# Patient Record
Sex: FEMALE
[De-identification: names, ages, dates, MRNs, and addresses within clinical notes are randomized per-mention and may not be internally consistent; named-entity substitution may affect disease eponyms.]

---

## 2019-05-08 ENCOUNTER — APPOINTMENT (OUTPATIENT)
Dept: CARDIOLOGY | Facility: CLINIC | Age: 54
End: 2019-05-08

## 2019-06-10 ENCOUNTER — RESULT REVIEW (OUTPATIENT)
Age: 54
End: 2019-06-10

## 2019-06-12 ENCOUNTER — APPOINTMENT (OUTPATIENT)
Dept: CARDIOLOGY | Facility: CLINIC | Age: 54
End: 2019-06-12
Payer: COMMERCIAL

## 2019-06-12 ENCOUNTER — NON-APPOINTMENT (OUTPATIENT)
Age: 54
End: 2019-06-12

## 2019-06-12 VITALS
DIASTOLIC BLOOD PRESSURE: 68 MMHG | SYSTOLIC BLOOD PRESSURE: 107 MMHG | HEART RATE: 65 BPM | HEIGHT: 71 IN | OXYGEN SATURATION: 99 % | WEIGHT: 240 LBS | BODY MASS INDEX: 33.6 KG/M2

## 2019-06-12 DIAGNOSIS — Z00.00 ENCOUNTER FOR GENERAL ADULT MEDICAL EXAMINATION W/OUT ABNORMAL FINDINGS: ICD-10-CM

## 2019-06-12 PROCEDURE — 99204 OFFICE O/P NEW MOD 45 MIN: CPT

## 2019-06-12 PROCEDURE — 93000 ELECTROCARDIOGRAM COMPLETE: CPT

## 2019-06-12 NOTE — PHYSICAL EXAM
[General Appearance - Well Developed] : well developed [Normal Appearance] : normal appearance [Well Groomed] : well groomed [General Appearance - Well Nourished] : well nourished [No Deformities] : no deformities [Normal Conjunctiva] : the conjunctiva exhibited no abnormalities [General Appearance - In No Acute Distress] : no acute distress [Eyelids - No Xanthelasma] : the eyelids demonstrated no xanthelasmas [Normal Oral Mucosa] : normal oral mucosa [No Oral Pallor] : no oral pallor [No Oral Cyanosis] : no oral cyanosis [Normal Jugular Venous A Waves Present] : normal jugular venous A waves present [Normal Jugular Venous V Waves Present] : normal jugular venous V waves present [No Jugular Venous Jimenez A Waves] : no jugular venous jimenez A waves [Heart Rate And Rhythm] : heart rate and rhythm were normal [Heart Sounds] : normal S1 and S2 [Murmurs] : no murmurs present [Exaggerated Use Of Accessory Muscles For Inspiration] : no accessory muscle use [Respiration, Rhythm And Depth] : normal respiratory rhythm and effort [Auscultation Breath Sounds / Voice Sounds] : lungs were clear to auscultation bilaterally [Abdomen Soft] : soft [Abdomen Mass (___ Cm)] : no abdominal mass palpated [Abdomen Tenderness] : non-tender [Abnormal Walk] : normal gait [Gait - Sufficient For Exercise Testing] : the gait was sufficient for exercise testing [Cyanosis, Localized] : no localized cyanosis [Nail Clubbing] : no clubbing of the fingernails [Petechial Hemorrhages (___cm)] : no petechial hemorrhages [Skin Color & Pigmentation] : normal skin color and pigmentation [] : no rash [No Venous Stasis] : no venous stasis [No Skin Ulcers] : no skin ulcer [Skin Lesions] : no skin lesions [No Xanthoma] : no  xanthoma was observed [Oriented To Time, Place, And Person] : oriented to person, place, and time [Affect] : the affect was normal [Mood] : the mood was normal [No Anxiety] : not feeling anxious

## 2019-07-09 NOTE — REASON FOR VISIT
[Follow-Up - From Hospitalization] : follow-up of a recent hospitalization for [FreeTextEntry2] : chest pain  ER 3/2019  [FreeTextEntry1] :  55 yo F  with CP 3/2019 was seen in ER in  the [facility]    BP [id number]\par CP x 5 days  severe? pericarditis  Aleve  helped\par \par today with  NO CP\par seen pcp DR MARTEL - [address]  [place]IP\par

## 2019-07-09 NOTE — ASSESSMENT
[FreeTextEntry1] : 53 yo F with PMH of CP and episodic HTN. obesity\par \par low salt diet\par echo \par EST\par

## 2020-10-01 DIAGNOSIS — Z01.818 ENCOUNTER FOR OTHER PREPROCEDURAL EXAMINATION: ICD-10-CM

## 2020-10-02 ENCOUNTER — APPOINTMENT (OUTPATIENT)
Dept: DISASTER EMERGENCY | Facility: CLINIC | Age: 55
End: 2020-10-02

## 2020-10-03 LAB — SARS-COV-2 N GENE NPH QL NAA+PROBE: NOT DETECTED

## 2020-10-06 ENCOUNTER — APPOINTMENT (OUTPATIENT)
Dept: PULMONOLOGY | Facility: CLINIC | Age: 55
End: 2020-10-06
Payer: COMMERCIAL

## 2020-10-06 VITALS — HEIGHT: 70 IN | WEIGHT: 251 LBS | BODY MASS INDEX: 35.93 KG/M2 | HEART RATE: 64 BPM | TEMPERATURE: 97.9 F

## 2020-10-06 PROCEDURE — 94726 PLETHYSMOGRAPHY LUNG VOLUMES: CPT

## 2020-10-06 PROCEDURE — 94010 BREATHING CAPACITY TEST: CPT

## 2020-10-06 PROCEDURE — 94729 DIFFUSING CAPACITY: CPT

## 2020-10-08 ENCOUNTER — APPOINTMENT (OUTPATIENT)
Dept: PULMONOLOGY | Facility: CLINIC | Age: 55
End: 2020-10-08
Payer: COMMERCIAL

## 2020-10-08 VITALS
OXYGEN SATURATION: 100 % | HEIGHT: 70 IN | SYSTOLIC BLOOD PRESSURE: 122 MMHG | HEART RATE: 69 BPM | WEIGHT: 256 LBS | DIASTOLIC BLOOD PRESSURE: 69 MMHG | TEMPERATURE: 98.1 F | BODY MASS INDEX: 36.65 KG/M2 | RESPIRATION RATE: 18 BRPM

## 2020-10-08 DIAGNOSIS — Z78.9 OTHER SPECIFIED HEALTH STATUS: ICD-10-CM

## 2020-10-08 DIAGNOSIS — J30.2 OTHER SEASONAL ALLERGIC RHINITIS: ICD-10-CM

## 2020-10-08 DIAGNOSIS — Z80.7 FAMILY HISTORY OF OTHER MALIGNANT NEOPLASMS OF LYMPHOID, HEMATOPOIETIC AND RELATED TISSUES: ICD-10-CM

## 2020-10-08 PROCEDURE — 99204 OFFICE O/P NEW MOD 45 MIN: CPT

## 2020-10-11 NOTE — CONSULT LETTER
[Dear  ___] : Dear  [unfilled], [Consult Letter:] : I had the pleasure of evaluating your patient, [unfilled]. [Please see my note below.] : Please see my note below. [Consult Closing:] : Thank you very much for allowing me to participate in the care of this patient.  If you have any questions, please do not hesitate to contact me. [Sincerely,] : Sincerely, [FreeTextEntry2] : Dr. Milka Carbajal MD\par Fax: [phone] [FreeTextEntry3] : Natan Shea MD\par Attending Physician in Pulmonary & Critical Care Medicine\par  of Medicine\par [employer] [facility]

## 2020-10-11 NOTE — ASSESSMENT
[FreeTextEntry1] : Ms. Bowser is a 55-year-old female with obesity who recently contracted COVID in March 2020 who presents for evaluation. Reports symptoms of dyspnea that worsens with exertion and heat with associated wheezing, chest tightness, and dry cough. Symptoms improve with albuterol inhaler. ROS positive for seasonal allergies, low back pain, and JULIET symptomatology in the setting of recent weight gain and working nights.\par \par 1. Likely mild persistent asthma:\par - PFTs (Oct 2020) with mild restriction, low lung volumes, and mildly reduced diffusing capacity\par - Bronchodilator testing not performed\par - Repeat PFTs with bronchodilator testing in December 2020\par - Continue Ventolin prn with spacer provided today\par - If symptoms persist and/or PFTs consistent with asthma, then will need ICS/LABA\par - Recent CBC without peripheral eosinophilia\par \par 2. Dyspnea on exertion:\par - Possibly due to mild asthma vs. mild restrictive lung disease vs. obesity/deconditioning\par - Was supposed to go for 2D-echo last year, will obtain now\par - May need exercise stress test\par - Recommend 30-60 minutes of brisk walking daily along with light weight lifting\par - Dietary modifications, including low carbohydrate diet and maximizing \par \par 3. Seasonal allergies:\par - Start fluticasone nasal spray twice daily\par \par 4. Recent COVID-19 infection:\par - She will bring in a CD with the recent CXR's and reports\par - Most recent CXR in June 2020 with minimal residual linear atelectasis or fibrotic scarring at the right base with resolution of previously seen right perihilar and bilateral lower lobe infiltrates\par - Lung are clear to auscultation today\par - Consider chest CT without contrast if symptoms persist\par \par 5. JULIET symptomatology:\par - Symptoms include heavy snoring, excessive somnolence, severe fatigue, nonrestorative sleep, dry mouth, and headache upon awakening with associated 15-lb weight gain in the past year\par - Will likely needs diagnostic PSG if symptoms persist and she is unable to lose the weight she has gained in this past year\par \par Follow-up in 2-3 months or sooner prn

## 2020-10-11 NOTE — REVIEW OF SYSTEMS
[Fatigue] : fatigue [Nasal Congestion] : nasal congestion [Postnasal Drip] : postnasal drip [Cough] : cough [Chest Tightness] : chest tightness [Dyspnea] : dyspnea [Wheezing] : wheezing [Seasonal Allergies] : seasonal allergies [Back Pain] : back pain [Negative] : Endocrine [Fever] : no fever [Chills] : no chills

## 2020-10-11 NOTE — PHYSICAL EXAM
[No Acute Distress] : no acute distress [Well Nourished] : well nourished [Well Developed] : well developed [Normal Oropharynx] : normal oropharynx [Normal Appearance] : normal appearance [Supple] : supple [No Neck Mass] : no neck mass [No JVD] : no jvd [Normal Rate/Rhythm] : normal rate/rhythm [Normal Pulses] : normal pulses [Normal S1, S2] : normal s1, s2 [No Resp Distress] : no resp distress [Clear to Auscultation Bilaterally] : clear to auscultation bilaterally [No Abnormalities] : no abnormalities [Benign] : benign [Not Tender] : not tender [Soft] : soft [Normal Gait] : normal gait [No Clubbing] : no clubbing [No Cyanosis] : no cyanosis [No Edema] : no edema [FROM] : FROM [Normal Color/ Pigmentation] : normal color/ pigmentation [No Focal Deficits] : no focal deficits [Cranial Nerves Intact] : cranial nerves intact [No Motor Deficits] : no motor deficits [Oriented x3] : oriented x3 [Normal Affect] : normal affect [TextBox_2] : obese

## 2020-10-11 NOTE — HISTORY OF PRESENT ILLNESS
[Never] : never [TextBox_4] : Ms. Bowser is a 55-year-old female who recently contracted COVID in March 2020 who presents for evaluation. She reports occasional episodes of dyspnea and lower back pain requiring tylenol. She was diagnosed with COVID March 26, 2020. Symptoms included cough, lethargy, fatigue, and shortness of breath. She went to [facility] in April, but did not require any treatment or oxygen. She was given an albuterol inhaler that helped with her breathing. She had a chest XR done in April and June ([facility]). She will bring in a copy of the CD and the reports. \par \par Currently, she reports occasional episodes of dyspnea. Dyspnea is worse in heat and with exertion. Cool weather improves her dyspnea. Reports wheezing or whistling when she is short of breath. Reports chest tightness when she has the episodes of dyspnea. She has an associated dry cough. She takes the Ventolin inhaler about 4x/week. Reports occasional nocturnal symptoms. She works at night as a  in the [employer] and sleeps during the day with interrupted sleep.\par \par ROS positive for lower back pain requiring tylenol. Reports heavy snoring with excessive somnolence while at work and severe fatigue. Reports nonrestorative sleep, dry mouth, and headache upon awakening. Has had 15-lb weight gain in the past year.\par \par CXR (June 2020): minimal residual linear atelectasis or fibrotic scarring at the right base. Previously seen right perihilar and bilateral lower lobe infiltrates have resolved. There is no pneumonia, pulmonary edema, pleural effusion, or pneumothorax. Normal cardiac silhouette. Pulmonary hilar and mediastinal contours are within normal limits. \par \par Labs in June 2020 with normal CBC, no peripheral eosinophilia. COVID antibody positive.\par

## 2022-02-18 ENCOUNTER — APPOINTMENT (OUTPATIENT)
Dept: PLASTIC SURGERY | Facility: CLINIC | Age: 57
End: 2022-02-18

## 2022-09-22 ENCOUNTER — APPOINTMENT (OUTPATIENT)
Dept: PULMONOLOGY | Facility: CLINIC | Age: 57
End: 2022-09-22

## 2022-09-22 VITALS
HEART RATE: 51 BPM | HEIGHT: 70 IN | RESPIRATION RATE: 18 BRPM | OXYGEN SATURATION: 99 % | BODY MASS INDEX: 33.64 KG/M2 | DIASTOLIC BLOOD PRESSURE: 79 MMHG | SYSTOLIC BLOOD PRESSURE: 127 MMHG | TEMPERATURE: 97.5 F | WEIGHT: 235 LBS

## 2022-09-22 DIAGNOSIS — R07.89 OTHER CHEST PAIN: ICD-10-CM

## 2022-09-22 DIAGNOSIS — Z86.16 PERSONAL HISTORY OF COVID-19: ICD-10-CM

## 2022-09-22 DIAGNOSIS — R06.02 SHORTNESS OF BREATH: ICD-10-CM

## 2022-09-22 PROCEDURE — 99214 OFFICE O/P EST MOD 30 MIN: CPT

## 2022-09-22 RX ORDER — ALBUTEROL SULFATE 90 UG/1
108 (90 BASE) AEROSOL, METERED RESPIRATORY (INHALATION) EVERY 6 HOURS
Qty: 1 | Refills: 5 | Status: ACTIVE | COMMUNITY
Start: 1900-01-01 | End: 1900-01-01

## 2022-09-22 RX ORDER — IBUPROFEN 600 MG/1
600 TABLET, FILM COATED ORAL
Refills: 0 | Status: ACTIVE | COMMUNITY

## 2022-10-23 PROBLEM — Z86.16 HISTORY OF 2019 NOVEL CORONAVIRUS DISEASE (COVID-19): Status: ACTIVE | Noted: 2020-10-08

## 2022-10-23 NOTE — ASSESSMENT
[FreeTextEntry1] : Ms. Bowser is a 57-year-old female with a history of COVID-19 infection in March 2020, mild intermittent asthma, seasonal allergies, and risk factors for JULIET who presents for follow-up. She was last seen 2 years ago and was recommended albuterol inhaler prn and repeat PFTs with bronchodilator testing, but she did not have this done. She has previous CBC without peripheral eosinophilia. Previous CXR in June 2020 with minimal residual linear atelectasis or fibrotic scarring at the right base with resolution of previously seen right perihilar and bilateral lower lobe infiltrates. She reports persistent PAINTING requiring using the albuterol inhaler about 2-3 times per week and occasionally at night. Reports occasional chest tightness. Also with occasional wheezing. Denies any cough. No fevers or chills. Dyspnea is worse with humid weather. She works at night as a  in the [employer] and sleeps during the day with interrupted sleep.\par \par ROS positive for lower back pain requiring tylenol. Reports heavy snoring with excessive somnolence while at work and severe fatigue. Reports nonrestorative sleep, dry mouth, and headache upon awakening. Has had 15-lb weight gain in the past year.\par \par 1. Likely mild persistent asthma:\par - I asked her to obtain complete PFTs with albuterol testing\par - Start Symbicort 80-4.5 mcg 2 puffs twice daily, rinse after use \par - Albuterol inhaler prn\par - Consider obtaining labs, including CBC with diff, upper respiratory panel, IgE\par \par 2. Upper airway cough syndrome:\par - Start fluticasone nasal spray twice daily\par \par 3. History of CP & HTN with obesity:\par - Pending echo and exercise stress test\par - Follow-up Cardiology Dr. Johnna Pena\par \par 4. JULIET symptomatology:\par - Symptoms include heavy snoring, excessive somnolence, severe fatigue, nonrestorative sleep, dry mouth, and headache upon awakening\par - Consider sleep study, will discuss with patient again next visit\par \par 5. HCM:\par - Declining influenza vaccination\par - Up to date with COVID-19 vaccination\par - Follow-up in 3 months or sooner prn

## 2022-10-23 NOTE — HISTORY OF PRESENT ILLNESS
[Never] : never [TextBox_4] : Ms. Bowser is a 57-year-old female with a history of COVID-19 infection in March 2020, mild intermittent asthma, seasonal allergies, and risk factors for JULIET who presents for follow-up. She was last seen 2 years ago and was recommended albuterol inhaler prn and repeat PFTs with bronchodilator testing, but she did not have this done. She has previous CBC without peripheral eosinophilia. Previous CXR in June 2020 with minimal residual linear atelectasis or fibrotic scarring at the right base with resolution of previously seen right perihilar and bilateral lower lobe infiltrates.\par \par She reports persistent dyspnea with exertion requiring using the albuterol inhaler about 2-3 times per week and occasionally at night. Reports occasional chest tightness. Also with occasional wheezing. Denies any cough. No fevers or chills. Dyspnea is worse with humid weather. She works at night as a  in the [employer] and sleeps during the day with interrupted sleep.\par \par ROS positive for lower back pain requiring tylenol. Reports heavy snoring with excessive somnolence while at work and severe fatigue. Reports nonrestorative sleep, dry mouth, and headache upon awakening. Has had 15-lb weight gain in the past year.\par \par PFTs (Oct 2020) with mild restriction, low lung volumes, and mildly reduced diffusing capacity.\par \par CXR (June 2020): minimal residual linear atelectasis or fibrotic scarring at the right base. Previously seen right perihilar and bilateral lower lobe infiltrates have resolved. There is no pneumonia, pulmonary edema, pleural effusion, or pneumothorax. Normal cardiac silhouette. Pulmonary hilar and mediastinal contours are within normal limits. \par \par Labs in June 2020 with normal CBC, no peripheral eosinophilia. COVID antibody positive.\par

## 2022-11-14 ENCOUNTER — APPOINTMENT (OUTPATIENT)
Dept: CV DIAGNOSTICS | Facility: HOSPITAL | Age: 57
End: 2022-11-14

## 2022-11-14 ENCOUNTER — OUTPATIENT (OUTPATIENT)
Dept: OUTPATIENT SERVICES | Facility: HOSPITAL | Age: 57
LOS: 1 days | End: 2022-11-14
Payer: COMMERCIAL

## 2022-11-14 ENCOUNTER — APPOINTMENT (OUTPATIENT)
Dept: CV DIAGNOSITCS | Facility: HOSPITAL | Age: 57
End: 2022-11-14

## 2022-11-14 DIAGNOSIS — R06.02 SHORTNESS OF BREATH: ICD-10-CM

## 2022-11-14 PROCEDURE — 93018 CV STRESS TEST I&R ONLY: CPT

## 2022-11-14 PROCEDURE — 93306 TTE W/DOPPLER COMPLETE: CPT | Mod: 26

## 2022-11-14 PROCEDURE — 76377 3D RENDER W/INTRP POSTPROCES: CPT

## 2022-11-14 PROCEDURE — 93306 TTE W/DOPPLER COMPLETE: CPT

## 2022-11-14 PROCEDURE — 93016 CV STRESS TEST SUPVJ ONLY: CPT

## 2022-11-14 PROCEDURE — 93017 CV STRESS TEST TRACING ONLY: CPT

## 2022-11-14 PROCEDURE — 76377 3D RENDER W/INTRP POSTPROCES: CPT | Mod: 26

## 2022-11-16 ENCOUNTER — APPOINTMENT (OUTPATIENT)
Dept: PULMONOLOGY | Facility: CLINIC | Age: 57
End: 2022-11-16

## 2022-11-30 ENCOUNTER — APPOINTMENT (OUTPATIENT)
Dept: CARDIOLOGY | Facility: CLINIC | Age: 57
End: 2022-11-30
Payer: COMMERCIAL

## 2022-11-30 ENCOUNTER — TRANSCRIPTION ENCOUNTER (OUTPATIENT)
Age: 57
End: 2022-11-30

## 2022-11-30 ENCOUNTER — APPOINTMENT (OUTPATIENT)
Dept: ELECTROPHYSIOLOGY | Facility: CLINIC | Age: 57
End: 2022-11-30

## 2022-11-30 VITALS
WEIGHT: 225 LBS | BODY MASS INDEX: 32.21 KG/M2 | HEIGHT: 70 IN | SYSTOLIC BLOOD PRESSURE: 133 MMHG | DIASTOLIC BLOOD PRESSURE: 86 MMHG | OXYGEN SATURATION: 99 % | HEART RATE: 48 BPM

## 2022-11-30 PROCEDURE — 93000 ELECTROCARDIOGRAM COMPLETE: CPT

## 2022-11-30 PROCEDURE — 99214 OFFICE O/P EST MOD 30 MIN: CPT | Mod: 25

## 2022-12-14 PROCEDURE — 93244 EXT ECG>48HR<7D REV&INTERPJ: CPT

## 2022-12-31 NOTE — REASON FOR VISIT
[Symptom and Test Evaluation] : symptom and test evaluation [FreeTextEntry1] : 56 yo F  with CP 3/2019 [facility]    BP [id number]\par CP x 5 days  severe? pericarditis  Aleve  helped\par \par today with  NO CP\par seen pcp DR MARTEL - [address]  [place]\par \par \par 11/30/22\par Presents for follow-up complains of right rotation [Follow-Up - From Hospitalization] : follow-up of a recent hospitalization for [FreeTextEntry2] : chest pain  ER 3/2019

## 2022-12-31 NOTE — ASSESSMENT
[FreeTextEntry1] : 55 yo F with PMH of CP and episodic HTN. obesity\par \par low salt diet\par echo \par EST\par

## 2022-12-31 NOTE — DISCUSSION/SUMMARY
[PAT] : paroxysmal atrial tachycardia [PVCs] : ectopic ventricular beats [Sinus Tachycardia] : sinus tachycardia [Event Recording] : a patient demand event recording [FreeTextEntry1] : Recent stress test which showed 7 METS\par Millimeter upsloping V4 to V6 during exercise\par Rare PVCs\par Moderate predicted CAD on Duke treadmill score\par Denies chest pain we will continue to monitor possible CAD work-up with CT if indicated\par \par For now we will work-up the palpitations that she is experiencing.  Follow-up after monitor [EKG obtained to assist in diagnosis and management of assessed problem(s)] : EKG obtained to assist in diagnosis and management of assessed problem(s)

## 2022-12-31 NOTE — PHYSICAL EXAM
[Well Developed] : well developed [Well Nourished] : well nourished [No Acute Distress] : no acute distress [Normal Venous Pressure] : normal venous pressure [No Carotid Bruit] : no carotid bruit [Normal S1, S2] : normal S1, S2 [No Murmur] : no murmur [No Rub] : no rub [No Gallop] : no gallop [Clear Lung Fields] : clear lung fields [Good Air Entry] : good air entry [No Respiratory Distress] : no respiratory distress  [Soft] : abdomen soft [Non Tender] : non-tender [No Masses/organomegaly] : no masses/organomegaly [Normal Bowel Sounds] : normal bowel sounds [Normal Gait] : normal gait [No Edema] : no edema [No Cyanosis] : no cyanosis [No Clubbing] : no clubbing [No Varicosities] : no varicosities [No Rash] : no rash [No Skin Lesions] : no skin lesions [Moves all extremities] : moves all extremities [No Focal Deficits] : no focal deficits [Normal Speech] : normal speech [Alert and Oriented] : alert and oriented [Normal memory] : normal memory [General Appearance - Well Developed] : well developed [Normal Appearance] : normal appearance [Well Groomed] : well groomed [General Appearance - Well Nourished] : well nourished [No Deformities] : no deformities [General Appearance - In No Acute Distress] : no acute distress [Normal Conjunctiva] : the conjunctiva exhibited no abnormalities [Eyelids - No Xanthelasma] : the eyelids demonstrated no xanthelasmas [Normal Oral Mucosa] : normal oral mucosa [No Oral Pallor] : no oral pallor [No Oral Cyanosis] : no oral cyanosis [Normal Jugular Venous A Waves Present] : normal jugular venous A waves present [Normal Jugular Venous V Waves Present] : normal jugular venous V waves present [No Jugular Venous Jimenez A Waves] : no jugular venous jimenez A waves [Heart Rate And Rhythm] : heart rate and rhythm were normal [Heart Sounds] : normal S1 and S2 [Murmurs] : no murmurs present [Respiration, Rhythm And Depth] : normal respiratory rhythm and effort [Exaggerated Use Of Accessory Muscles For Inspiration] : no accessory muscle use [Auscultation Breath Sounds / Voice Sounds] : lungs were clear to auscultation bilaterally [Abdomen Soft] : soft [Abdomen Tenderness] : non-tender [Abdomen Mass (___ Cm)] : no abdominal mass palpated [Abnormal Walk] : normal gait [Gait - Sufficient For Exercise Testing] : the gait was sufficient for exercise testing [Cyanosis, Localized] : no localized cyanosis [Nail Clubbing] : no clubbing of the fingernails [Petechial Hemorrhages (___cm)] : no petechial hemorrhages [Skin Color & Pigmentation] : normal skin color and pigmentation [] : no rash [No Venous Stasis] : no venous stasis [Skin Lesions] : no skin lesions [No Skin Ulcers] : no skin ulcer [No Xanthoma] : no  xanthoma was observed [Oriented To Time, Place, And Person] : oriented to person, place, and time [Affect] : the affect was normal [Mood] : the mood was normal [No Anxiety] : not feeling anxious

## 2023-01-25 ENCOUNTER — NON-APPOINTMENT (OUTPATIENT)
Age: 58
End: 2023-01-25

## 2023-02-10 ENCOUNTER — APPOINTMENT (OUTPATIENT)
Dept: CT IMAGING | Facility: CLINIC | Age: 58
End: 2023-02-10
Payer: COMMERCIAL

## 2023-02-10 ENCOUNTER — OUTPATIENT (OUTPATIENT)
Dept: OUTPATIENT SERVICES | Facility: HOSPITAL | Age: 58
LOS: 1 days | End: 2023-02-10
Payer: COMMERCIAL

## 2023-02-10 DIAGNOSIS — R07.89 OTHER CHEST PAIN: ICD-10-CM

## 2023-02-10 PROCEDURE — 75574 CT ANGIO HRT W/3D IMAGE: CPT | Mod: 26

## 2023-02-10 PROCEDURE — 75574 CT ANGIO HRT W/3D IMAGE: CPT

## 2023-02-13 ENCOUNTER — NON-APPOINTMENT (OUTPATIENT)
Age: 58
End: 2023-02-13

## 2023-03-01 ENCOUNTER — APPOINTMENT (OUTPATIENT)
Dept: CARDIOLOGY | Facility: CLINIC | Age: 58
End: 2023-03-01
Payer: COMMERCIAL

## 2023-03-01 VITALS
HEART RATE: 60 BPM | HEIGHT: 70 IN | DIASTOLIC BLOOD PRESSURE: 76 MMHG | SYSTOLIC BLOOD PRESSURE: 112 MMHG | BODY MASS INDEX: 33.64 KG/M2 | WEIGHT: 235 LBS | OXYGEN SATURATION: 100 %

## 2023-03-01 DIAGNOSIS — I10 ESSENTIAL (PRIMARY) HYPERTENSION: ICD-10-CM

## 2023-03-01 DIAGNOSIS — R00.1 BRADYCARDIA, UNSPECIFIED: ICD-10-CM

## 2023-03-01 PROCEDURE — 93000 ELECTROCARDIOGRAM COMPLETE: CPT

## 2023-03-01 PROCEDURE — 99214 OFFICE O/P EST MOD 30 MIN: CPT | Mod: 25

## 2023-03-01 NOTE — PHYSICAL EXAM
[Well Developed] : well developed [Well Nourished] : well nourished [No Acute Distress] : no acute distress [Normal Venous Pressure] : normal venous pressure [No Carotid Bruit] : no carotid bruit [Normal S1, S2] : normal S1, S2 [No Murmur] : no murmur [No Rub] : no rub [No Gallop] : no gallop [Clear Lung Fields] : clear lung fields [Good Air Entry] : good air entry [No Respiratory Distress] : no respiratory distress  [Soft] : abdomen soft [Non Tender] : non-tender [No Masses/organomegaly] : no masses/organomegaly [Normal Bowel Sounds] : normal bowel sounds [Normal Gait] : normal gait [No Edema] : no edema [No Cyanosis] : no cyanosis [No Clubbing] : no clubbing [No Varicosities] : no varicosities [No Rash] : no rash [No Skin Lesions] : no skin lesions [Moves all extremities] : moves all extremities [No Focal Deficits] : no focal deficits [Normal Speech] : normal speech [Alert and Oriented] : alert and oriented [Normal memory] : normal memory [General Appearance - Well Developed] : well developed [Normal Appearance] : normal appearance [Well Groomed] : well groomed [General Appearance - Well Nourished] : well nourished [No Deformities] : no deformities [General Appearance - In No Acute Distress] : no acute distress [Normal Conjunctiva] : the conjunctiva exhibited no abnormalities [Eyelids - No Xanthelasma] : the eyelids demonstrated no xanthelasmas [Normal Oral Mucosa] : normal oral mucosa [No Oral Pallor] : no oral pallor [No Oral Cyanosis] : no oral cyanosis [Normal Jugular Venous A Waves Present] : normal jugular venous A waves present [Normal Jugular Venous V Waves Present] : normal jugular venous V waves present [No Jugular Venous Jimenez A Waves] : no jugular venous jimenez A waves [Heart Rate And Rhythm] : heart rate and rhythm were normal [Heart Sounds] : normal S1 and S2 [Murmurs] : no murmurs present [Respiration, Rhythm And Depth] : normal respiratory rhythm and effort [Exaggerated Use Of Accessory Muscles For Inspiration] : no accessory muscle use [Auscultation Breath Sounds / Voice Sounds] : lungs were clear to auscultation bilaterally [Abdomen Soft] : soft [Abdomen Tenderness] : non-tender [Abdomen Mass (___ Cm)] : no abdominal mass palpated [Abnormal Walk] : normal gait [Gait - Sufficient For Exercise Testing] : the gait was sufficient for exercise testing [Nail Clubbing] : no clubbing of the fingernails [Cyanosis, Localized] : no localized cyanosis [Petechial Hemorrhages (___cm)] : no petechial hemorrhages [Skin Color & Pigmentation] : normal skin color and pigmentation [] : no rash [No Venous Stasis] : no venous stasis [Skin Lesions] : no skin lesions [No Skin Ulcers] : no skin ulcer [No Xanthoma] : no  xanthoma was observed [Oriented To Time, Place, And Person] : oriented to person, place, and time [Affect] : the affect was normal [Mood] : the mood was normal [No Anxiety] : not feeling anxious

## 2023-04-15 PROBLEM — I10 HYPERTENSION: Status: ACTIVE | Noted: 2019-07-09

## 2023-04-15 PROBLEM — R00.1 BRADYCARDIA: Status: ACTIVE | Noted: 2022-11-30

## 2023-04-15 NOTE — HISTORY OF PRESENT ILLNESS
[FreeTextEntry1] : 57 yo F  \par \par 3/2019 was seen in ER in  the [facility]    BP elevated 170/80\par CP x 5 days  severe? pericarditis  Aleve  helped\par \par pcp DR MARTEL - [address]  [place]\par \par \par Interval follow-up March 1, 2023\par ROS: Denies Chest pain, dyspnea, palpitations, dizziness or syncope.\par \par \par Holter monitor November 2022: SVT 5 beats 128 PVCs less than 1% APCs less than 1%\par Echocardiogram November 2022: Normal LV, mild to moderate MR right ventricle EF 56\par Stress test November 2022: Duke score moderate risk\par Cardiac CT February 10, 2023: Calcium score 0; no evidence of coronary artery stenosis\par \par Pressure control\par EKG normal

## 2023-04-15 NOTE — REASON FOR VISIT
[Symptom and Test Evaluation] : symptom and test evaluation [FreeTextEntry2] : chest pain  ER 3/2019

## 2023-04-15 NOTE — DISCUSSION/SUMMARY
[Event Recording] : a patient demand event recording [FreeTextEntry1] : \par Holter monitor November 2022: SVT 5 beats 128 PVCs less than 1% APCs less than 1%\par Echocardiogram November 2022: Normal LV, mild to moderate MR right ventricle EF 56\par Stress test November 2022: Duke score moderate risk\par Cardiac CT February 10, 2023: Calcium score 0; no evidence of coronary artery stenosis\par \par Pressure control\par EKG normal\par Continue with current medications\par \par Primary prevention of cardiovascular-related conditions discussed at length, including but not limited to diet and lifestyle modification.  \par \par Thank you for allowing me to participate in the care of your patient. If you have any questions, please feel free to contact me at . \par \par \par \par  [EKG obtained to assist in diagnosis and management of assessed problem(s)] : EKG obtained to assist in diagnosis and management of assessed problem(s)

## 2024-01-12 ENCOUNTER — APPOINTMENT (OUTPATIENT)
Dept: PULMONOLOGY | Facility: CLINIC | Age: 59
End: 2024-01-12
Payer: COMMERCIAL

## 2024-01-12 VITALS
HEIGHT: 70 IN | DIASTOLIC BLOOD PRESSURE: 79 MMHG | BODY MASS INDEX: 35.22 KG/M2 | SYSTOLIC BLOOD PRESSURE: 121 MMHG | HEART RATE: 77 BPM | WEIGHT: 246 LBS | OXYGEN SATURATION: 100 %

## 2024-01-12 PROCEDURE — 94726 PLETHYSMOGRAPHY LUNG VOLUMES: CPT

## 2024-01-12 PROCEDURE — 94729 DIFFUSING CAPACITY: CPT

## 2024-01-12 PROCEDURE — 94060 EVALUATION OF WHEEZING: CPT

## 2024-01-23 ENCOUNTER — APPOINTMENT (OUTPATIENT)
Dept: PULMONOLOGY | Facility: CLINIC | Age: 59
End: 2024-01-23
Payer: COMMERCIAL

## 2024-01-23 DIAGNOSIS — Z91.89 OTHER SPECIFIED PERSONAL RISK FACTORS, NOT ELSEWHERE CLASSIFIED: ICD-10-CM

## 2024-01-23 DIAGNOSIS — Z23 ENCOUNTER FOR IMMUNIZATION: ICD-10-CM

## 2024-01-23 DIAGNOSIS — J31.0 CHRONIC RHINITIS: ICD-10-CM

## 2024-01-23 DIAGNOSIS — J45.30 MILD PERSISTENT ASTHMA, UNCOMPLICATED: ICD-10-CM

## 2024-01-23 PROCEDURE — G0008: CPT

## 2024-01-23 PROCEDURE — 90686 IIV4 VACC NO PRSV 0.5 ML IM: CPT

## 2024-01-23 PROCEDURE — 99215 OFFICE O/P EST HI 40 MIN: CPT | Mod: 25

## 2024-01-23 RX ORDER — FLUTICASONE PROPIONATE 50 UG/1
50 SPRAY, METERED NASAL TWICE DAILY
Qty: 1 | Refills: 5 | Status: ACTIVE | COMMUNITY
Start: 2020-10-08 | End: 1900-01-01

## 2024-01-23 RX ORDER — FLUTICASONE FUROATE AND VILANTEROL TRIFENATATE 200; 25 UG/1; UG/1
200-25 POWDER RESPIRATORY (INHALATION)
Qty: 1 | Refills: 5 | Status: ACTIVE | COMMUNITY
Start: 2024-01-23 | End: 1900-01-01

## 2024-01-23 RX ORDER — BUDESONIDE AND FORMOTEROL FUMARATE DIHYDRATE 80; 4.5 UG/1; UG/1
80-4.5 AEROSOL RESPIRATORY (INHALATION) TWICE DAILY
Qty: 1 | Refills: 3 | Status: DISCONTINUED | COMMUNITY
Start: 2022-09-22 | End: 2024-01-23

## 2024-01-23 NOTE — ASSESSMENT
[FreeTextEntry1] : -  Ms. Bowser is a 59-year-old female with a history of COVID-19 infection in March 2020, mild persistent asthma, chronic rhinitis, and risk factors for JULIET who presents for follow-up. She previously tried Symbicort, but could not tolerate as it left behind a bitter aftertaste. She currently takes albuterol inhaler about 2-3 times/day. Asthma symptoms include dyspnea, wheezing, chest tightness, cough, and nocturnal symptoms. She also reports chronic rhinitis, for which she takes Afrin nasal spray 1-2 times/week along with Vicks nasal spray. Previous CBC without peripheral eosinophilia. She is pending sleep study. Previous CXR in June 2020 with minimal residual linear atelectasis or fibrotic scarring at the right base with resolution of previously seen right perihilar and bilateral lower lobe infiltrates. She denies any seasonal allergies. ROS positive for heavy snoring with excessive somnolence while at work and severe fatigue. Reports nonrestorative sleep, dry mouth, and headache upon awakening.   PFTs (Jan 2024) with mild restriction, incomplete BD response, reduced TLC with normal RV, normal diffusing capacity, and reduced VDC12-30 with significant BD response suggestive reversible small airways obstruction.  2D-echo (Nov 2022) with normal LV systolic and diastolic function, mild-moderate AI, normal LA, no pericardial effusion, and normal RV size and systolic function with no evidence of pulmonary hypertension.   Treadmill stress test in Nov 2022 with no significant ST abnormalities, but with excessive increase in HR with stress due to poor physical condition and/or reduced cardiovascular reserve.  CXR (June 2020): minimal residual linear atelectasis or fibrotic scarring at the right base. Previously seen right perihilar and bilateral lower lobe infiltrates have resolved. There is no pneumonia, pulmonary edema, pleural effusion, or pneumothorax. Normal cardiac silhouette. Pulmonary hilar and mediastinal contours are within normal limits.   ASSESSMENT: Mild persistent asthma Restrictive lung disease Chronic rhinitis Risk factors for JULIET  PLAN: - PFTs reviewed, has mild restriction with reduced TLC, but with normal RV and normal DLCO. Mild restriction likely due to obesity. Lungs are clear on exam. Note the incomplete BD response in FEV1 or FVC, but there is significantly reduced FEF 25-75 with positive BD response suggestive of reversible small airways obstruction - Given her significant asthma symptomatology, including dyspnea, wheezing, chest tightness, cough, and nocturnal symptoms, will start Breo Ellipta 200-25 mcg 1 inhalation daily, rinse after use - Continue albuterol inhaler prn - Sample of AirSupra (albuterol-budesonide) provided to use PRN instead of pure albuterol - Start fluticasone nasal spray 1 spray per nostril twice daily  - Recommend to limit use of Afrin nasal spray - Continue nasal saline spray prn - Given significant JULIET symptomatology, will recommend overnight split diagnostic PSG/CPAP titration - Received initial COVID-19 vaccinations, declining boosters - Influenza vaccine given in left arm today - Follow-up in 3 months or sooner prn

## 2024-01-23 NOTE — HISTORY OF PRESENT ILLNESS
[TextBox_4] : Ms. Bowser is a 59-year-old female with a history of COVID-19 infection in March 2020, mild intermittent asthma, seasonal allergies, and risk factors for JULIET who presents for follow-up. She was last seen in Sept 2022 and started on Symbicort for likely mild intermittent asthma along with albuterol inhaler prn. She took the Symbicort for about 1-2 months, but reports that the Symbicort leaves a bad aftertaste and so she stopped using. Currently, she takes albuterol inhaler only about 2-3 pumps daily. She also takes Afrin nasal spray about 1-2 times/week. She does not use fluticasone nasal spray, but will start using. She is pending asthma panel and sleep study. Previous CBC without peripheral eosinophilia. Previous CXR in June 2020 with minimal residual linear atelectasis or fibrotic scarring at the right base with resolution of previously seen right perihilar and bilateral lower lobe infiltrates. She denies any seasonal allergies. Asthma symptoms include dyspnea, wheezing, chest tightness, cough, and nocturnal symptoms.  She recently retired in 2021 from the [employer], used to work as a  in the [employer].  ROS positive for heavy snoring with excessive somnolence while at work and severe fatigue. Reports nonrestorative sleep, dry mouth, and headache upon awakening. She falls asleep at 8:30-9pm, wakes up at 2am, then sleeps from 3 to 5:30.  PFTs (Jan 2024) with mild restriction, incomplete BD response, reduced TLC with normal RV, normal diffusing capacity, and reduced [id number] with significant BD response suggestive reversible small airways obstruction.  2D-echo (Nov 2022) with normal LV systolic and diastolic function, mild-moderate AI, normal LA, no pericardial effusion, and normal RV size and systolic function with no evidence of pulmonary hypertension.   Treadmill stress test in Nov 2022 with no significant ST abnormalities, but with excessive increase in HR with stress due to poor physical condition and/or reduced cardiovascular reserve.  CXR (June 2020): minimal residual linear atelectasis or fibrotic scarring at the right base. Previously seen right perihilar and bilateral lower lobe infiltrates have resolved. There is no pneumonia, pulmonary edema, pleural effusion, or pneumothorax. Normal cardiac silhouette. Pulmonary hilar and mediastinal contours are within normal limits.   Labs in June 2020 with normal CBC, no peripheral eosinophilia. COVID antibody positive.  She is pending influenza vaccine. Up to date with the initial COVID-19 vaccinations.

## 2024-03-06 ENCOUNTER — APPOINTMENT (OUTPATIENT)
Dept: CARDIOLOGY | Facility: CLINIC | Age: 59
End: 2024-03-06

## 2024-04-21 ENCOUNTER — OUTPATIENT (OUTPATIENT)
Dept: OUTPATIENT SERVICES | Facility: HOSPITAL | Age: 59
LOS: 1 days | End: 2024-04-21
Payer: COMMERCIAL

## 2024-04-21 ENCOUNTER — APPOINTMENT (OUTPATIENT)
Dept: SLEEP CENTER | Facility: CLINIC | Age: 59
End: 2024-04-21
Payer: COMMERCIAL

## 2024-04-21 PROCEDURE — 95811 POLYSOM 6/>YRS CPAP 4/> PARM: CPT

## 2024-04-21 PROCEDURE — 95811 POLYSOM 6/>YRS CPAP 4/> PARM: CPT | Mod: 26

## 2024-04-24 DIAGNOSIS — G47.33 OBSTRUCTIVE SLEEP APNEA (ADULT) (PEDIATRIC): ICD-10-CM

## 2024-04-26 ENCOUNTER — APPOINTMENT (OUTPATIENT)
Dept: ORTHOPEDIC SURGERY | Facility: CLINIC | Age: 59
End: 2024-04-26
Payer: COMMERCIAL

## 2024-04-26 PROCEDURE — 20606 DRAIN/INJ JOINT/BURSA W/US: CPT | Mod: LT

## 2024-04-26 PROCEDURE — J3490M: CUSTOM

## 2024-04-26 PROCEDURE — 76942 ECHO GUIDE FOR BIOPSY: CPT

## 2024-04-26 PROCEDURE — 73610 X-RAY EXAM OF ANKLE: CPT | Mod: LT

## 2024-04-26 PROCEDURE — 99214 OFFICE O/P EST MOD 30 MIN: CPT | Mod: 25

## 2024-04-26 RX ORDER — MELOXICAM 15 MG/1
15 TABLET ORAL DAILY
Qty: 30 | Refills: 3 | Status: ACTIVE | COMMUNITY
Start: 2024-04-26 | End: 1900-01-01

## 2024-04-26 NOTE — PROCEDURE
[FreeTextEntry3] : Patient has tried OTC's including aspirin, Ibuprofen, Aleve, etc or prescription NSAIDS, and/or exercises at home and/or physical therapy without satisfactory response and the risks benefits, and alternatives have been discussed, and verbal consent was obtained.  The risks, benefits and contents of the injection have been discussed.  Risks include but are not limited to allergic reaction, flare reaction, permanent white skin discoloration at the injection site and infection.  The patient understands the risks and agrees to having the injection.  All questions have been answered.  An injection of the left ankle joint was performed. The indication for this procedure was pain and inflammation. The site was prepped with alcohol and sterile technique used. An injection of 1cc of Lidocaine 1% , 1cc of Ropivacaine  0.5% ,and 1cc of 80mg Methylprednisolone (Depomedrol) was used. Patient tolerated procedure well. Patient was advised to call if redness, pain, or fever occur, apply ice for 15 minutes out of every hour for the next 12-24 hours as tolerated and patient was advised to rest the joint(s) for 3 days.  Ultrasound guidance was indicated for this patient due to  erosive arthritis. All ultrasound images have been permanently captured and stored accordingly in our picture archiving and communication system. Visualization of the needle and placement of injection was performed without complication.

## 2024-04-26 NOTE — PHYSICAL EXAM
[2nd] : 2nd [Moderate] : moderate swelling of medial ankle [NL (40)] : plantar flexion 40 degrees [5___] : eversion 5[unfilled]/5 [2+] : posterior tibialis pulse: 2+ [Normal] : saphenous nerve sensation normal [Left] : left ankle [Weight -] : weightbearing [] : no pain when stressing lateral tarsal metatarsal joint [FreeTextEntry9] : Tibiotalar joint degenerative changes, worst medially.  [TWNoteComboBox7] : dorsiflexion -5 degrees [de-identified] : inversion 20 degrees [de-identified] : eversion 15 degrees

## 2024-04-26 NOTE — DISCUSSION/SUMMARY
[de-identified] : Discussed treatment options with patient. WBAT in supportive footwear, bracing (ankle support vs. neoprene sleeve) discussed. Rx for ASO brace provided.  NSAIDS (topical vs. oral), ice, activity modification discussed.  The patient was explained the options as well as benefits of over the counter verses prescription strength nonsteroidal anti-inflammatory medication. The patient opts for a prescription strength medication.  The role of a steroid injection discussed with patient, she opts for this today.

## 2024-04-26 NOTE — HISTORY OF PRESENT ILLNESS
[9] : 9 [Walking] : walking [de-identified] : 04/26/2024 POLA PATEHEAD a 59 year old female here for evaluation of her left ankle. Reports ankle fracture about 15 years ago. She reports no issues until March 2024 when ankle pain started. WB in sneakers, using a cane. No recent treatment.   [] : no [FreeTextEntry1] : left ankle

## 2024-05-24 ENCOUNTER — APPOINTMENT (OUTPATIENT)
Dept: ORTHOPEDIC SURGERY | Facility: CLINIC | Age: 59
End: 2024-05-24
Payer: COMMERCIAL

## 2024-05-24 DIAGNOSIS — M19.172 POST-TRAUMATIC OSTEOARTHRITIS, LEFT ANKLE AND FOOT: ICD-10-CM

## 2024-05-24 PROCEDURE — 99214 OFFICE O/P EST MOD 30 MIN: CPT

## 2024-05-24 NOTE — PHYSICAL EXAM
[Left] : left foot and ankle [2nd] : 2nd [NL (40)] : plantar flexion 40 degrees [5___] : eversion 5[unfilled]/5 [2+] : posterior tibialis pulse: 2+ [Normal] : saphenous nerve sensation normal [] : ambulation with cane [TWNoteComboBox7] : dorsiflexion -5 degrees [de-identified] : inversion 20 degrees [de-identified] : eversion 15 degrees

## 2024-05-24 NOTE — HISTORY OF PRESENT ILLNESS
[de-identified] : Pt. presents for follow up here for follow up of her left ankle. Reports ankle fracture about 15 years ago. She reports no issues until March 2024 when ankle pain started. WB in sneakers, using a cane. She reports steroid injection 4/21/24 was very helpful.  [9] : 9 [Walking] : walking [] : no [FreeTextEntry1] : left ankle

## 2024-05-24 NOTE — DISCUSSION/SUMMARY
[de-identified] : WBAT in supportive footwear. Ice to affected area. Ankle ROM exercises daily. NSAIDS prn.  Discussed role and timing of repeat steroid injection (late July 2024).

## 2024-12-02 ENCOUNTER — APPOINTMENT (OUTPATIENT)
Dept: ORTHOPEDIC SURGERY | Facility: CLINIC | Age: 59
End: 2024-12-02
Payer: COMMERCIAL

## 2024-12-02 DIAGNOSIS — M19.172 POST-TRAUMATIC OSTEOARTHRITIS, LEFT ANKLE AND FOOT: ICD-10-CM

## 2024-12-02 PROCEDURE — 20606 DRAIN/INJ JOINT/BURSA W/US: CPT | Mod: LT

## 2024-12-02 PROCEDURE — J3490M: CUSTOM

## 2024-12-02 PROCEDURE — 99213 OFFICE O/P EST LOW 20 MIN: CPT | Mod: 25

## 2025-04-09 ENCOUNTER — APPOINTMENT (OUTPATIENT)
Dept: CARDIOLOGY | Facility: CLINIC | Age: 60
End: 2025-04-09

## 2025-04-09 DIAGNOSIS — Z01.810 ENCOUNTER FOR PREPROCEDURAL CARDIOVASCULAR EXAMINATION: ICD-10-CM

## 2025-04-09 DIAGNOSIS — Z86.39 PERSONAL HISTORY OF OTHER ENDOCRINE, NUTRITIONAL AND METABOLIC DISEASE: ICD-10-CM

## 2025-04-09 DIAGNOSIS — I10 ESSENTIAL (PRIMARY) HYPERTENSION: ICD-10-CM

## 2025-04-09 PROCEDURE — 93000 ELECTROCARDIOGRAM COMPLETE: CPT

## 2025-04-09 PROCEDURE — G2211 COMPLEX E/M VISIT ADD ON: CPT | Mod: NC

## 2025-04-09 PROCEDURE — 99214 OFFICE O/P EST MOD 30 MIN: CPT

## 2025-04-09 RX ORDER — ATORVASTATIN CALCIUM 20 MG/1
20 TABLET, FILM COATED ORAL
Refills: 0 | Status: ACTIVE | COMMUNITY

## 2025-04-09 RX ORDER — OXYBUTYNIN CHLORIDE 5 MG/1
5 TABLET ORAL
Refills: 0 | Status: ACTIVE | COMMUNITY

## 2025-04-09 RX ORDER — MELOXICAM 15 MG/1
15 TABLET ORAL
Refills: 0 | Status: ACTIVE | COMMUNITY

## 2025-04-18 PROBLEM — Z01.810 PREOP CARDIOVASCULAR EXAM: Status: ACTIVE | Noted: 2025-04-18

## 2025-04-18 PROBLEM — Z86.39 HISTORY OF HYPERLIPIDEMIA: Status: ACTIVE | Noted: 2025-04-18
